# Patient Record
Sex: MALE | Race: OTHER | Employment: UNEMPLOYED | URBAN - METROPOLITAN AREA
[De-identification: names, ages, dates, MRNs, and addresses within clinical notes are randomized per-mention and may not be internally consistent; named-entity substitution may affect disease eponyms.]

---

## 2022-11-02 ENCOUNTER — HOSPITAL ENCOUNTER (EMERGENCY)
Facility: HOSPITAL | Age: 27
Discharge: HOME | End: 2022-11-03
Attending: EMERGENCY MEDICINE

## 2022-11-02 DIAGNOSIS — R10.30 LOWER ABDOMINAL PAIN: Primary | ICD-10-CM

## 2022-11-02 LAB
ANION GAP SERPL CALC-SCNC: 5 MMOL/L (ref 0–18)
BASOPHILS # BLD AUTO: 0.05 X10(3) UL (ref 0–0.2)
BASOPHILS NFR BLD AUTO: 0.6 %
BILIRUB UR QL: NEGATIVE
BUN BLD-MCNC: 10 MG/DL (ref 7–18)
BUN/CREAT SERPL: 10.2 (ref 10–20)
CALCIUM BLD-MCNC: 9.3 MG/DL (ref 8.5–10.1)
CHLORIDE SERPL-SCNC: 105 MMOL/L (ref 98–112)
CLARITY UR: CLEAR
CO2 SERPL-SCNC: 29 MMOL/L (ref 21–32)
COLOR UR: YELLOW
CREAT BLD-MCNC: 0.98 MG/DL
DEPRECATED RDW RBC AUTO: 39.4 FL (ref 35.1–46.3)
EOSINOPHIL # BLD AUTO: 0.07 X10(3) UL (ref 0–0.7)
EOSINOPHIL NFR BLD AUTO: 0.8 %
ERYTHROCYTE [DISTWIDTH] IN BLOOD BY AUTOMATED COUNT: 12.4 % (ref 11–15)
GFR SERPLBLD BASED ON 1.73 SQ M-ARVRAT: 108 ML/MIN/1.73M2 (ref 60–?)
GLUCOSE BLD-MCNC: 88 MG/DL (ref 70–99)
GLUCOSE UR-MCNC: NEGATIVE MG/DL
HCT VFR BLD AUTO: 44.1 %
HGB BLD-MCNC: 14.7 G/DL
HGB UR QL STRIP.AUTO: NEGATIVE
IMM GRANULOCYTES # BLD AUTO: 0.02 X10(3) UL (ref 0–1)
IMM GRANULOCYTES NFR BLD: 0.2 %
KETONES UR-MCNC: NEGATIVE MG/DL
LIPASE SERPL-CCNC: 103 U/L (ref 73–393)
LYMPHOCYTES # BLD AUTO: 3.3 X10(3) UL (ref 1–4)
LYMPHOCYTES NFR BLD AUTO: 39.9 %
MCH RBC QN AUTO: 28.8 PG (ref 26–34)
MCHC RBC AUTO-ENTMCNC: 33.3 G/DL (ref 31–37)
MCV RBC AUTO: 86.5 FL
MONOCYTES # BLD AUTO: 0.56 X10(3) UL (ref 0.1–1)
MONOCYTES NFR BLD AUTO: 6.8 %
NEUTROPHILS # BLD AUTO: 4.27 X10 (3) UL (ref 1.5–7.7)
NEUTROPHILS # BLD AUTO: 4.27 X10(3) UL (ref 1.5–7.7)
NEUTROPHILS NFR BLD AUTO: 51.7 %
NITRITE UR QL STRIP.AUTO: NEGATIVE
OSMOLALITY SERPL CALC.SUM OF ELEC: 286 MOSM/KG (ref 275–295)
PH UR: 6 [PH] (ref 5–8)
PLATELET # BLD AUTO: 199 10(3)UL (ref 150–450)
POTASSIUM SERPL-SCNC: 4.6 MMOL/L (ref 3.5–5.1)
PROT UR-MCNC: NEGATIVE MG/DL
RBC # BLD AUTO: 5.1 X10(6)UL
SODIUM SERPL-SCNC: 139 MMOL/L (ref 136–145)
SP GR UR STRIP: 1.01 (ref 1–1.03)
UROBILINOGEN UR STRIP-ACNC: <2
VIT C UR-MCNC: NEGATIVE MG/DL
WBC # BLD AUTO: 8.3 X10(3) UL (ref 4–11)

## 2022-11-02 PROCEDURE — 83690 ASSAY OF LIPASE: CPT

## 2022-11-02 PROCEDURE — 87086 URINE CULTURE/COLONY COUNT: CPT | Performed by: EMERGENCY MEDICINE

## 2022-11-02 PROCEDURE — 80048 BASIC METABOLIC PNL TOTAL CA: CPT | Performed by: EMERGENCY MEDICINE

## 2022-11-02 PROCEDURE — 83690 ASSAY OF LIPASE: CPT | Performed by: EMERGENCY MEDICINE

## 2022-11-02 PROCEDURE — 99284 EMERGENCY DEPT VISIT MOD MDM: CPT

## 2022-11-02 PROCEDURE — 96374 THER/PROPH/DIAG INJ IV PUSH: CPT

## 2022-11-02 PROCEDURE — 85025 COMPLETE CBC W/AUTO DIFF WBC: CPT

## 2022-11-02 PROCEDURE — 87086 URINE CULTURE/COLONY COUNT: CPT

## 2022-11-02 PROCEDURE — 80048 BASIC METABOLIC PNL TOTAL CA: CPT

## 2022-11-02 PROCEDURE — 96361 HYDRATE IV INFUSION ADD-ON: CPT

## 2022-11-02 PROCEDURE — 81001 URINALYSIS AUTO W/SCOPE: CPT | Performed by: EMERGENCY MEDICINE

## 2022-11-02 PROCEDURE — 81001 URINALYSIS AUTO W/SCOPE: CPT

## 2022-11-02 PROCEDURE — 85025 COMPLETE CBC W/AUTO DIFF WBC: CPT | Performed by: EMERGENCY MEDICINE

## 2022-11-02 RX ORDER — MORPHINE SULFATE 4 MG/ML
4 INJECTION, SOLUTION INTRAMUSCULAR; INTRAVENOUS ONCE
Status: COMPLETED | OUTPATIENT
Start: 2022-11-02 | End: 2022-11-02

## 2022-11-03 ENCOUNTER — APPOINTMENT (OUTPATIENT)
Dept: CT IMAGING | Facility: HOSPITAL | Age: 27
End: 2022-11-03
Attending: EMERGENCY MEDICINE

## 2022-11-03 VITALS
DIASTOLIC BLOOD PRESSURE: 71 MMHG | TEMPERATURE: 98 F | HEIGHT: 66 IN | RESPIRATION RATE: 18 BRPM | SYSTOLIC BLOOD PRESSURE: 118 MMHG | HEART RATE: 73 BPM | OXYGEN SATURATION: 98 % | WEIGHT: 175 LBS | BODY MASS INDEX: 28.13 KG/M2

## 2022-11-03 PROCEDURE — 74176 CT ABD & PELVIS W/O CONTRAST: CPT | Performed by: EMERGENCY MEDICINE

## 2022-11-03 NOTE — ED INITIAL ASSESSMENT (HPI)
Yi speaking pt, came in with wife who speaks English for right sided abdominal pain x 2 weeks, denies N/V/D. Per wife pt has intermittent blood in urine x 3 months. Has history of Appendectomy 2014. A/O x 4, breathing unlabored.

## 2024-10-19 NOTE — ED INITIAL ASSESSMENT (HPI)
Pt here for eval of an area on R side of nose that wont stop bleeding. Reports he was washing up and noticed it was bleeding.    #[id number]

## 2024-10-19 NOTE — ED PROVIDER NOTES
Chief Complaint   Patient presents with    Bleeding       HPI:     Siva Kerr is a 29 year old male who presents today with a chief complaint of bleed to external nostril on right.  Reports he has had a lesion there for about 20 years now, may have accidentally picked at it today, now won't stop bleeding.    PFSH      PFS asessment screens reviewed and agree.  Nurses notes reviewed I agree with documentation.    History reviewed. No pertinent family history.  Family history reviewed with patient/caregiver and is not pertinent to presenting problem.  Social History     Socioeconomic History    Marital status:      Spouse name: Not on file    Number of children: Not on file    Years of education: Not on file    Highest education level: Not on file   Occupational History    Not on file   Tobacco Use    Smoking status: Not on file    Smokeless tobacco: Not on file   Substance and Sexual Activity    Alcohol use: Not on file    Drug use: Not on file    Sexual activity: Not on file   Other Topics Concern    Not on file   Social History Narrative    Not on file     Social Drivers of Health     Financial Resource Strain: Not on file   Food Insecurity: Not on file   Transportation Needs: Not on file   Physical Activity: Not on file   Stress: Not on file   Social Connections: Not on file   Housing Stability: Not on file         ROS:   Positive for stated complaint: bleeding  All other systems reviewed and negative except as noted above.  Constitutional and Vital Signs Reviewed.      Physical Exam:       Physical Exam:  /71   Pulse 65   Temp 98.1 °F (36.7 °C) (Temporal)   Resp 18   SpO2 97%   GENERAL: well developed, well nourished, well hydrated, no distress  SKIN: Small, 0.3 annular lesion to the right external nostril, with active bleed.  Nontender.    MDM/Assessment/Plan:   Orders for this encounter:    No orders of the defined types were placed in this encounter.      Labs performed this  visit:  No results found for this or any previous visit (from the past 10 hours).    Procedures: area was cleansed with normal saline and alcohol. Tiny amount of silver nitrate applied to stop bleed with success.    MDM:  Medical Decision Making  Differentials considered: Benign hemangioma versus granuloma versus melanoma vs other    Tiny amount of silver nitrate applied, to stop bleed.  Advised he leave area alone until follow-up with dermatology.  Phone number for dermatology was provided.  Patient verbalized understanding and agreeable to plan of care.      used for the duration of this visit            Diagnosis:    ICD-10-CM    1. Lesion of nose  J34.89           All results reviewed and discussed with patient.  See AVS for detailed discharge instructions for your condition today.    Follow Up with:  [facility]  [address]  [facility] [id number] [phone]